# Patient Record
(demographics unavailable — no encounter records)

---

## 2024-10-22 NOTE — REASON FOR VISIT
[Consultation] : a consultation visit [FreeTextEntry1] : follow up after hospitalization for rectal pain

## 2024-10-22 NOTE — CONSULT LETTER
[Dear  ___] : Dear  [unfilled], [Consult Letter:] : I had the pleasure of evaluating your patient, [unfilled]. [Please see my note below.] : Please see my note below. [Consult Closing:] : Thank you very much for allowing me to participate in the care of this patient.  If you have any questions, please do not hesitate to contact me. [Sincerely,] : Sincerely, [FreeTextEntry3] : Elliot Rhoades M.D.

## 2024-10-22 NOTE — PHYSICAL EXAM
[Alert] : alert [Normal Voice/Communication] : normal voice/communication [Well Developed] : well developed [Well Nourished] : well nourished [Obese (BMI >= 30)] : obese (BMI >= 30) [None] : no edema [Bowel Sounds] : normal bowel sounds [No Masses] : no abdominal mass palpated [Abdomen Soft] : soft [] : no hepatosplenomegaly [Occult Blood] : negative occult blood [Inguinal Lymph Nodes Enlarged Bilaterally] : no inguinal lymphadenopathy [Normal Color / Pigmentation] : normal skin color and pigmentation [Normal] : oriented to person, place, and time [de-identified] : striae [de-identified] : acute anterior fissure; no hemorrhoids seen

## 2024-10-22 NOTE — REVIEW OF SYSTEMS
[Abdominal Pain] : abdominal pain [Negative] : Heme/Lymph [As Noted in HPI] : as noted in HPI [FreeTextEntry7] : rectal pain, bleeding

## 2024-10-22 NOTE — HISTORY OF PRESENT ILLNESS
[FreeTextEntry1] : Declan presents with anorectal pain and rectal bleeding.  He was in his usual state of health until last week when he had noted some blood on the stool and on toilet bowl for a couple of days.  He thinks that he may have had a hard stool and/or straining to defecate prior to seeing the blood.  He presented to [facility] ED on 10/15 where labs were essentially normal and CT scan was unrevealing.  He was seen by PMD who felt he might have a hemorrhoid, was referred to GI.  He also has had twinges of pain in the abdomen, felt to possibly have kidney stones, but urinalysis was normal.  He has used Metamucil, Docusate, Preparation-H and analgesics with incomplete relief; he has ordered Sitz bath, too.

## 2024-10-22 NOTE — ASSESSMENT
[FreeTextEntry1] : 1.  Rectal pain and bleeding, found to have anterior anal fissure on today's exam. 2.  Obesity. 3.  History of anxiety/depression, panic attacks. 4.  Hypertension. 5.  Ex-smoker. 6.  Snores.  Plan: 1.  Medical records reviewed. 2.  Try LidaZone at bedtime, on awakening and as needed for 7-10 days.  Pending insurance coverage, may need to try 2.5% hydrocortisone cream with RectiCare instead. 3.  Agree with Metamucil and stool softeners for now.  Dietary instruction given.  Proceed with sitz baths, wipes as directed. 4.  If symptoms remain problematic despite above measures, would recommend formal consultation with colorectal surgeon. 5.  No plans for lower endoscopy at this time

## 2024-10-24 NOTE — REVIEW OF SYSTEMS
General
[FreeTextEntry1] : As per HPI otherwise negative
[FreeTextEntry1] : As per HPI otherwise negative

## 2024-10-24 NOTE — ASSESSMENT
[FreeTextEntry1] : Discussed with the patient health care maintenance and age and condition appropriate vaccinations. Further discussed age-appropriate cancer screening testing as indicated including colon cancer screening/colonoscopy, cervical cancer screening/PAP testing, breast cancer screening/mammogram and skin cancer screening. Discussed healthy diet, exercise and weight control for health. Discussed healthy habits including abstaining from smoking and drugs and abstention/moderation with alcohol. Discussed routine regular ophthalmology and dental follow up.   I spent a total of 45 mins on the care of this patient on date of service including reviewing records, obtaining a history and conducting a physical examination; as well as counseling and educating the patient.

## 2024-10-24 NOTE — PHYSICAL EXAM
[No Acute Distress] : no acute distress [EOMI] : extraocular movements intact [Normal Oropharynx] : the oropharynx was normal [Supple] : supple [Clear to Auscultation] : lungs were clear to auscultation bilaterally [Normal S1, S2] : normal S1 and S2 [No Edema] : there was no peripheral edema [Soft] : abdomen soft [Non Tender] : non-tender [Tender, Swollen] : that was tender and swollen [No Rash] : no rash [Coordination Grossly Intact] : coordination grossly intact [Normal Affect] : the affect was normal [Normal Insight/Judgement] : insight and judgment were intact [Skin Tags] : there were no residual hemorrhoidal skin tags seen [de-identified] : Morbidly obese [FreeTextEntry1] : *GINA Nguyen present to chaperone

## 2024-10-24 NOTE — HISTORY OF PRESENT ILLNESS
[FreeTextEntry1] : Physical Rectal pain Depression possible substance dependence Blood in the stool Blood in the urine  [de-identified] : This is a 27-year-old male with no significant past medical history that presents with an extensive list of complaints.  Per the patient he was seen in the emergency room a few days ago for blood in the stool and rectal pain.  A CT scan of the abdomen was negative and labs returned benign.  The patient continues to experience pain and is having difficulty sitting down.  Patient was referred over to GI and has a pending appointment.  He denies any constipation or hard stools.  Next the patient also complains of a remote history of hematuria approximately 2 months prior.  The patient denies any urinary complaints otherwise.  He denies any discharge, dysuria or otherwise discolored urine.  He further denies any flank pain.  He denies any fevers or chills, nausea or vomiting.  The patient reports that he was previously using cocaine, EtOH and marijuana to self-medicate. now in therapy. better. was suicidal. improved.  He currently denies any substance use or abuse.  He used to previously go to his therapist more regularly however now is going as needed.  Denies SI/HI  Next this patient complains of chronic right sided back pain, started about a year ago. was seeing chiro. s/p post injury in his teens during sports.  The pain comes and goes.  He never received formal physical therapy.

## 2024-10-24 NOTE — PHYSICAL EXAM
[No Acute Distress] : no acute distress [EOMI] : extraocular movements intact [Normal Oropharynx] : the oropharynx was normal [Supple] : supple [Clear to Auscultation] : lungs were clear to auscultation bilaterally [Normal S1, S2] : normal S1 and S2 [No Edema] : there was no peripheral edema [Soft] : abdomen soft [Non Tender] : non-tender [Tender, Swollen] : that was tender and swollen [No Rash] : no rash [Coordination Grossly Intact] : coordination grossly intact [Normal Affect] : the affect was normal [Normal Insight/Judgement] : insight and judgment were intact [Skin Tags] : there were no residual hemorrhoidal skin tags seen [de-identified] : Morbidly obese [FreeTextEntry1] : *GINA Nguyen present to chaperone

## 2024-10-24 NOTE — HISTORY OF PRESENT ILLNESS
[FreeTextEntry1] : Physical Rectal pain Depression possible substance dependence Blood in the stool Blood in the urine  [de-identified] : This is a 27-year-old male with no significant past medical history that presents with an extensive list of complaints.  Per the patient he was seen in the emergency room a few days ago for blood in the stool and rectal pain.  A CT scan of the abdomen was negative and labs returned benign.  The patient continues to experience pain and is having difficulty sitting down.  Patient was referred over to GI and has a pending appointment.  He denies any constipation or hard stools.  Next the patient also complains of a remote history of hematuria approximately 2 months prior.  The patient denies any urinary complaints otherwise.  He denies any discharge, dysuria or otherwise discolored urine.  He further denies any flank pain.  He denies any fevers or chills, nausea or vomiting.  The patient reports that he was previously using cocaine, EtOH and marijuana to self-medicate. now in therapy. better. was suicidal. improved.  He currently denies any substance use or abuse.  He used to previously go to his therapist more regularly however now is going as needed.  Denies SI/HI  Next this patient complains of chronic right sided back pain, started about a year ago. was seeing chiro. s/p post injury in his teens during sports.  The pain comes and goes.  He never received formal physical therapy.

## 2024-10-25 NOTE — PHYSICAL EXAM
[No Acute Distress] : no acute distress [No Respiratory Distress] : no respiratory distress  [Normal Rate] : normal rate  [No Focal Deficits] : no focal deficits

## 2024-10-25 NOTE — HISTORY OF PRESENT ILLNESS
[FreeTextEntry1] : f/u htn [de-identified] : 26yo M with pmh significant for htn started on hctz at last visit presents for BP check. reports compliance with medication and no a/e,

## 2024-11-05 NOTE — REASON FOR VISIT
[TextEntry] : 27-year-old male who presents for gross hematuria  Patient reports about 2 months ago he had an episode of gross hematuria followed by a subsequent episode.  He was completely asymptomatic.  He had no burning, urgency, flank pain.  He denies baseline urinary symptoms.  He denies blood in the ejaculate.  He did mention that both episodes were associated with alcohol use.  He has a remote smoking history of social smoking in college about 5 years ago.  He denies any genital trauma.  He denies any stream change.  He denies recent sexually transmitted diseases or new sexual partners.  He denies urethral discharge.  He denies a history of kidney stones.  He has no family history of prostate cancer  He has regular bowels  He is currently working with a physical therapist for hip issues and they focus on pelvic floor exercises as well.  He is doing this in [facility].  Creatinine 1.05 10/2024 Urine culture - 10/2024 UA - 10/2024 A1c 5.4% CT abdomen pelvis with contrast with normal-appearing kidneys, bladder, prostate 10/15/2024

## 2024-11-05 NOTE — ASSESSMENT
[FreeTextEntry1] : 27-year-old male who presents for gross hematuria  Patient had 2 episodes of gross hematuria and provided photic graphs of the episodes.  Counseled that the workup generally includes a urine cytology, CT urogram, and a cystoscopy.  He did have a recent CT abdomen pelvis with contrast.  If he continues to have hematuria episodes with a negative cystoscopy and cytology, we will plan for CT urogram.   Follow-up urine cytology Return to office for cystoscopy

## 2025-01-10 NOTE — HISTORY OF PRESENT ILLNESS
[FreeTextEntry1] : right wrist pain [de-identified] : "worst pain of my life" taking ibuprofen for it  woke up last week, it was swollen, red and hot. difficult to move.  wrapping, tiger balm, ice and heat.  no further swelling but still painful. kept it wrapped overnight.  did exercise but the pain lingers. no longer swollen or red. repetitive motion hurts

## 2025-03-14 NOTE — HISTORY OF PRESENT ILLNESS
[de-identified] : Mr. CHARBEL JAEGER  is a 27 year old male who presents with right lumbar pain since September when he fell.  He has been doing physical therapy since October (ordered by his PCP) with minimal relief.  He has been doing a HEP and taking nsaids with temporary relief.  Denies any LE radicular symptoms.  Normal bowel and bladder control.   Denies any recent fevers, chills, sweats, weight loss, or infection.  The patients past medical history, past surgical history, medications, allergies, and social history were reviewed by me today with the patient and documented accordingly.  In addition, the patient's family history, which is noncontributory to their visit, was also reviewed.

## 2025-03-14 NOTE — PHYSICAL EXAM
[de-identified] : Examination of the lumbar spine reveals no midline tenderness palpation, step-offs, or skin lesions. Decreased range of motion with respect to flexion, extension, lateral bending, and rotation. No tenderness to palpation of the sciatic notch. No tenderness palpation of the bilateral greater trochanters. No pain with passive internal/external rotation of the hips. No instability of bilateral lower extremities.  Negative RHYS.  Positive straight leg raise bilaterally. No bowstring. Negative femoral stretch. 5 out of 5 iliopsoas, hip abductors, hips adductors, quadriceps, hamstrings, gastrocsoleus, tibialis anterior, extensor hallucis longus, peroneals. Grossly intact sensation to light touch bilateral lower extremities. 1+ patellar and Achilles reflexes. Downgoing Babinski. No clonus. Intact proprioception. Palpable pulses. No skin lesion and no edema on the right and left lower extremities. [de-identified] : AP lateral lumbar x-ray with some spondylosis.

## 2025-03-14 NOTE — DISCUSSION/SUMMARY
[de-identified] : We discussed further treatment options. He continues to have right sided leg pain consistent with lumbar radiculopathy despite medication and physical therapy treatment. He will get a Lumbar MRI .Follow-up afterwards.

## 2025-03-27 NOTE — HISTORY OF PRESENT ILLNESS
[FreeTextEntry1] : hair loss; rash on nose/dry scalp [de-identified] : 27M here for  1) hair loss x months. no fam hx. no treatments tried. Had bloodwork that was unremarkable 2) greasy scalp and rash around the nose. used ketoconazole shampoo in the scalp without improvement

## 2025-03-27 NOTE — PHYSICAL EXAM
[Alert] : alert [Oriented x 3] : ~L oriented x 3 [Well Nourished] : well nourished [Conjunctiva Non-injected] : conjunctiva non-injected [No Visual Lymphadenopathy] : no visual  lymphadenopathy [No Clubbing] : no clubbing [No Edema] : no edema [No Bromhidrosis] : no bromhidrosis [No Chromhidrosis] : no chromhidrosis [FreeTextEntry3] : thinning and recession of the frontal scalp with thinning on the vertex greasy scale on the scalp and around the nose

## 2025-03-27 NOTE — ASSESSMENT
[FreeTextEntry1] : #AGA - early Diagnosis reviewed Anticipatory guidance provided Tx options discussed Start finasteride 1mg QD. SE including sexual dysfunction, gynecomastia, and mood disturbance reviewed Start minoxidil 2.5mg QD. SED  #Seb Derm Unresponsive to ketoconazole shampoo Start ciclopirox shampoo TIW to the face and scalp. Alternate with OTC antidandruff shampoo Start HC 2.5% ointment BID x 1-2 weeks PRN flare  RTC 3-6 months